# Patient Record
Sex: FEMALE | Race: WHITE | NOT HISPANIC OR LATINO
[De-identification: names, ages, dates, MRNs, and addresses within clinical notes are randomized per-mention and may not be internally consistent; named-entity substitution may affect disease eponyms.]

---

## 2023-12-01 PROBLEM — Z00.00 ENCOUNTER FOR PREVENTIVE HEALTH EXAMINATION: Status: ACTIVE | Noted: 2023-12-01

## 2023-12-05 ENCOUNTER — NON-APPOINTMENT (OUTPATIENT)
Age: 53
End: 2023-12-05

## 2023-12-05 ENCOUNTER — TRANSCRIPTION ENCOUNTER (OUTPATIENT)
Age: 53
End: 2023-12-05

## 2023-12-05 ENCOUNTER — APPOINTMENT (OUTPATIENT)
Dept: BREAST CENTER | Facility: CLINIC | Age: 53
End: 2023-12-05
Payer: COMMERCIAL

## 2023-12-05 VITALS — SYSTOLIC BLOOD PRESSURE: 134 MMHG | DIASTOLIC BLOOD PRESSURE: 68 MMHG | HEART RATE: 65 BPM

## 2023-12-05 DIAGNOSIS — Z81.8 FAMILY HISTORY OF OTHER MENTAL AND BEHAVIORAL DISORDERS: ICD-10-CM

## 2023-12-05 DIAGNOSIS — Z82.0 FAMILY HISTORY OF EPILEPSY AND OTHER DISEASES OF THE NERVOUS SYSTEM: ICD-10-CM

## 2023-12-05 DIAGNOSIS — Z87.81 PERSONAL HISTORY OF (HEALED) TRAUMATIC FRACTURE: ICD-10-CM

## 2023-12-05 DIAGNOSIS — S42.402A UNSPECIFIED FRACTURE OF LOWER END OF LEFT HUMERUS, INITIAL ENCOUNTER FOR CLOSED FRACTURE: ICD-10-CM

## 2023-12-05 DIAGNOSIS — Z86.711 PERSONAL HISTORY OF PULMONARY EMBOLISM: ICD-10-CM

## 2023-12-05 DIAGNOSIS — Z12.31 ENCOUNTER FOR SCREENING MAMMOGRAM FOR MALIGNANT NEOPLASM OF BREAST: ICD-10-CM

## 2023-12-05 DIAGNOSIS — U07.1 COVID-19: ICD-10-CM

## 2023-12-05 DIAGNOSIS — Z86.79 PERSONAL HISTORY OF OTHER DISEASES OF THE CIRCULATORY SYSTEM: ICD-10-CM

## 2023-12-05 DIAGNOSIS — Z23 ENCOUNTER FOR IMMUNIZATION: ICD-10-CM

## 2023-12-05 DIAGNOSIS — N63.11 UNSPECIFIED LUMP IN THE RIGHT BREAST, UPPER OUTER QUADRANT: ICD-10-CM

## 2023-12-05 DIAGNOSIS — Z80.3 FAMILY HISTORY OF MALIGNANT NEOPLASM OF BREAST: ICD-10-CM

## 2023-12-05 DIAGNOSIS — R92.8 OTHER ABNORMAL AND INCONCLUSIVE FINDINGS ON DIAGNOSTIC IMAGING OF BREAST: ICD-10-CM

## 2023-12-05 DIAGNOSIS — Q67.6 PECTUS EXCAVATUM: ICD-10-CM

## 2023-12-05 DIAGNOSIS — Z80.1 FAMILY HISTORY OF MALIGNANT NEOPLASM OF TRACHEA, BRONCHUS AND LUNG: ICD-10-CM

## 2023-12-05 DIAGNOSIS — Z80.42 FAMILY HISTORY OF MALIGNANT NEOPLASM OF PROSTATE: ICD-10-CM

## 2023-12-05 DIAGNOSIS — R92.30 DENSE BREASTS, UNSPECIFIED: ICD-10-CM

## 2023-12-05 PROCEDURE — 99205 OFFICE O/P NEW HI 60 MIN: CPT

## 2023-12-05 RX ORDER — MULTIVIT-MIN/FOLIC/VIT K/LYCOP 400-300MCG
50 MCG TABLET ORAL
Refills: 0 | Status: ACTIVE | COMMUNITY

## 2023-12-05 RX ORDER — WARFARIN SODIUM 10 MG/1
10 TABLET ORAL
Refills: 0 | Status: ACTIVE | COMMUNITY

## 2023-12-05 RX ORDER — VIBEGRON 75 MG/1
TABLET, FILM COATED ORAL
Refills: 0 | Status: ACTIVE | COMMUNITY

## 2023-12-05 RX ORDER — GABAPENTIN 100 MG/1
CAPSULE ORAL
Refills: 0 | Status: ACTIVE | COMMUNITY

## 2024-04-29 ENCOUNTER — RESULT REVIEW (OUTPATIENT)
Age: 54
End: 2024-04-29

## 2024-05-31 ENCOUNTER — APPOINTMENT (OUTPATIENT)
Dept: HEMATOLOGY ONCOLOGY | Facility: CLINIC | Age: 54
End: 2024-05-31

## 2024-05-31 NOTE — DISCUSSION/SUMMARY
[FreeTextEntry1] : The visit was provided via telehealth using real-time 2-way audio visual technology. The patient, Brook Villatoro, was located at home, in [place], [place] at the time of the visit. The Genetic Counselor, Inez Butts, was located in [place], [place]. The patient and the Genetic Counselor both participated in the telehealth encounter. Consent for telehealth services was given on 5/10/2024 by the patient, Brook Villatoro.    REASON FOR CONSULT  Brook Villatoro is a 54-year-old female who was referred by Dr. Hickman for cancer genetic counseling and risk assessment due to her family history of cancer.    RELEVANT MEDICAL HISTORY    FAMILY HISTORY:  Ms. Villatoro is a healthy individual who has never had cancer. She has a family history of breast, prostate and skin cancer, see below.    OTHER MEDICAL AND SURGICAL HISTORY:   History of pulmonary embolism. History of pulmonary embolism in  (taking warfarin). Orthopedic surgeries. Sinus surgery. Harrod tooth extraction. Dense breasts. Gluten intolerance and lymphocytic gastritis.   PAST OB/GYN HISTORY:  Obstetrical History:   Age at Menarche: 12 Menopausal with LMP at age 51  Age at First Live Birth: 24 Oral Contraceptive Use: Yes, (took pill for 5 years and [place] Evra patch for 2 years)  Hormone Replacement Therapy: No   CANCER SCREENING HISTORY:    Breast: Normal CBE 2023. Last mammogram 2023 - heterogeneously dense breasts, wnl. US on 2023 showed multiple bilateral benign-appearing cysts and a new 8mm hypoechoic mass of right breast - possible lipoma. Sonographic surveillance suggested in 6 months for right breast mass.  Last MRI 10/30/2019 - multiple benign cysts seen bilaterally however no suspicious enhancing masses. History of previous left breast biopsy over 5 years ago, reported benign cyst (no records).  GYN: Last pelvic exam reported wnl in . Follows up annually. History of uterine cyst - with normal biopsy, 10 years ago.  Colon:  Last colonoscopy  - wnl. No previous history of polyps. Planned f/u in 10 years.  Skin: Never had FBSE. History of mole removed more than 10 years ago, normal pathology.      SOCIAL HISTORY:   -	Tobacco-product use: No   FAMILY HISTORY:  Maternal ancestry was reported as [place] and  and paternal ancestry was reported as [place]. A detailed family history of cancer was ascertained. Relevant diagnoses are detailed below and in the scanned pedigree.     To Ms. Villatoro's knowledge, no one in the family has had germline testing for cancer susceptibility.      RISK ASSESSMENT:  Ms. Villatoro's family history of early onset breast cancer as well as prostate cancer, is suggestive of an inherited predisposition to hereditary cancer syndrome. We recommended genetic testing with [employer]'s CustomNext panel. This test analyzes 28 genes: STORM, BAP1, BARD1, BRCA1, BRCA2, BRIP1, CDH1, CDK4, CDKN2A, CHEK2, EPCAM, HOXB13, MITF, MLH1, MSH2, MSH6, NF1, PALB2, PMS2, POT1, PTCH1, PTEN, RAD51C, RAD51D, RB1, STK11, SUFU, TP53.    We discussed the risks, benefits and limitations, and implications of genetic testing. We also discussed the psychosocial implications of genetic testing. Possible test results were reviewed with Ms. Villatoro, along with associated medical management options. The Genetic Information Non-discrimination Act (SHANT[place]) was also reviewed.     Ms. Villatoro verbally consented to the above mentioned genetic testing panel. Informed consent form was emailed to the patient, and a saliva sample kit will be mailed to the patient. Once the sample has been collected and sent out, Ms. Villatoro will inform us.     PLAN:    1. Saliva kit was sent to Ms. Villatoro's home for collection. Once collected and dropped off, patient will inform us.   2. Ms. Villatoro was sent a copy of the informed consent form via email to be filled, signed, and returned to us.  3. We will contact Ms. Villatoro once the results are available and will schedule a follow-up appointment, as needed. Results generally return in 2-3 weeks from the day the sample is received in the lab.    For any additional questions please call Cancer Genetics at [phone].       Inez Butts, MSc, [facility]  Genetic Counselor, Cancer Genetics      CC:   Dr. Hickman

## 2024-08-29 ENCOUNTER — NON-APPOINTMENT (OUTPATIENT)
Age: 54
End: 2024-08-29

## 2024-08-29 NOTE — DISCUSSION/SUMMARY
[FreeTextEntry1] : Summary: Patient was contacted multiple times to complete her genetic testing by sending her saliva sample to the testing company as was planned at her initial genetic counseling consult on 5/31/2024. This has not yet been completed. A letter was sent out to Ms. Villatoro today by PHOEBE Ramirez, requesting she contact our office if she would still like to proceed with genetic testing. If we do not receive a response, the genetic testing order will be canceled.   Inez Davis, [facility] Genetic Counselor, Cancer Genetics

## 2024-10-04 ENCOUNTER — NON-APPOINTMENT (OUTPATIENT)
Age: 54
End: 2024-10-04

## 2024-10-04 NOTE — ADDENDUM
[FreeTextEntry1] : Summary: Patient was contacted multiple times to complete her genetic testing by sending her saliva sample to the testing company as was planned at her initial genetic counseling consult on 5/10/2024. This has not yet been completed. A letter was sent out to the Ms. Villatoro by PHOEBE Ramirez, requesting she contact our office if she would still like to proceed with genetic testing. It has been over 2 weeks and we did not receive a response, the genetic testing order has now been canceled.   Inez Davis, [facility] Genetic Counselor, Cancer Genetics

## 2025-04-29 ENCOUNTER — APPOINTMENT (OUTPATIENT)
Dept: BREAST CENTER | Facility: CLINIC | Age: 55
End: 2025-04-29
Payer: COMMERCIAL

## 2025-04-29 VITALS
DIASTOLIC BLOOD PRESSURE: 60 MMHG | BODY MASS INDEX: 22.5 KG/M2 | HEIGHT: 66 IN | HEART RATE: 60 BPM | SYSTOLIC BLOOD PRESSURE: 120 MMHG | WEIGHT: 140 LBS

## 2025-04-29 DIAGNOSIS — R92.30 DENSE BREASTS, UNSPECIFIED: ICD-10-CM

## 2025-04-29 DIAGNOSIS — U07.1 COVID-19: ICD-10-CM

## 2025-04-29 DIAGNOSIS — N60.19 DIFFUSE CYSTIC MASTOPATHY OF UNSPECIFIED BREAST: ICD-10-CM

## 2025-04-29 DIAGNOSIS — Z80.3 FAMILY HISTORY OF MALIGNANT NEOPLASM OF BREAST: ICD-10-CM

## 2025-04-29 PROCEDURE — 99213 OFFICE O/P EST LOW 20 MIN: CPT

## 2025-04-29 RX ORDER — CHLORHEXIDINE GLUCONATE 4 %
LIQUID (ML) TOPICAL
Refills: 0 | Status: ACTIVE | COMMUNITY

## 2025-08-11 ENCOUNTER — RESULT REVIEW (OUTPATIENT)
Age: 55
End: 2025-08-11